# Patient Record
Sex: FEMALE | Race: WHITE | Employment: FULL TIME | URBAN - METROPOLITAN AREA
[De-identification: names, ages, dates, MRNs, and addresses within clinical notes are randomized per-mention and may not be internally consistent; named-entity substitution may affect disease eponyms.]

---

## 2020-11-20 ENCOUNTER — HOSPITAL ENCOUNTER (OUTPATIENT)
Dept: LAB | Age: 25
Discharge: HOME | End: 2020-11-20
Payer: COMMERCIAL

## 2020-11-20 LAB
ALBUMIN SERPL-MCNC: 3.6 G/DL (ref 3.4–5)
ALBUMIN/GLOB SERPL: 0.9 {RATIO} (ref 0.8–1.7)
ALP SERPL-CCNC: 59 U/L (ref 45–117)
ALT SERPL-CCNC: 32 U/L (ref 13–56)
ANION GAP SERPL CALC-SCNC: 4 MMOL/L (ref 3–18)
AST SERPL-CCNC: 15 U/L (ref 10–38)
BILIRUB SERPL-MCNC: 0.3 MG/DL (ref 0.2–1)
BUN SERPL-MCNC: 13 MG/DL (ref 7–18)
BUN/CREAT SERPL: 24 (ref 12–20)
CALCIUM SERPL-MCNC: 9.3 MG/DL (ref 8.5–10.1)
CHLORIDE SERPL-SCNC: 102 MMOL/L (ref 100–111)
CO2 SERPL-SCNC: 32 MMOL/L (ref 21–32)
CREAT SERPL-MCNC: 0.55 MG/DL (ref 0.6–1.3)
ERYTHROCYTE [DISTWIDTH] IN BLOOD BY AUTOMATED COUNT: 12.2 % (ref 11.6–14.5)
GLOBULIN SER CALC-MCNC: 3.8 G/DL (ref 2–4)
GLUCOSE SERPL-MCNC: 84 MG/DL (ref 74–99)
HCG SERPL QL: NEGATIVE
HCT VFR BLD AUTO: 40.9 % (ref 35–45)
HGB BLD-MCNC: 14 G/DL (ref 12–16)
MCH RBC QN AUTO: 32.4 PG (ref 24–34)
MCHC RBC AUTO-ENTMCNC: 34.2 G/DL (ref 31–37)
MCV RBC AUTO: 94.7 FL (ref 74–97)
PLATELET # BLD AUTO: 420 K/UL (ref 135–420)
PMV BLD AUTO: 10.2 FL (ref 9.2–11.8)
POTASSIUM SERPL-SCNC: 5.1 MMOL/L (ref 3.5–5.5)
PROT SERPL-MCNC: 7.4 G/DL (ref 6.4–8.2)
RBC # BLD AUTO: 4.32 M/UL (ref 4.2–5.3)
SODIUM SERPL-SCNC: 138 MMOL/L (ref 136–145)
WBC # BLD AUTO: 8.4 K/UL (ref 4.6–13.2)

## 2020-11-20 PROCEDURE — 80053 COMPREHEN METABOLIC PANEL: CPT

## 2020-11-20 PROCEDURE — 36415 COLL VENOUS BLD VENIPUNCTURE: CPT

## 2020-11-20 PROCEDURE — 84703 CHORIONIC GONADOTROPIN ASSAY: CPT

## 2020-11-20 PROCEDURE — 85027 COMPLETE CBC AUTOMATED: CPT

## 2020-12-07 ENCOUNTER — HOSPITAL ENCOUNTER (OUTPATIENT)
Dept: PREADMISSION TESTING | Age: 25
Discharge: HOME | End: 2020-12-07
Payer: COMMERCIAL

## 2020-12-07 PROCEDURE — 87635 SARS-COV-2 COVID-19 AMP PRB: CPT

## 2020-12-08 LAB — SARS-COV-2, COV2NT: NOT DETECTED

## 2020-12-10 ENCOUNTER — ANESTHESIA EVENT (OUTPATIENT)
Dept: SURGERY | Age: 25
End: 2020-12-10
Payer: COMMERCIAL

## 2020-12-11 ENCOUNTER — HOSPITAL ENCOUNTER (OUTPATIENT)
Age: 25
Setting detail: OUTPATIENT SURGERY
Discharge: HOME | End: 2020-12-11
Attending: ORTHOPAEDIC SURGERY | Admitting: ORTHOPAEDIC SURGERY
Payer: COMMERCIAL

## 2020-12-11 ENCOUNTER — ANESTHESIA (OUTPATIENT)
Dept: SURGERY | Age: 25
End: 2020-12-11
Payer: COMMERCIAL

## 2020-12-11 VITALS
BODY MASS INDEX: 27.89 KG/M2 | RESPIRATION RATE: 16 BRPM | WEIGHT: 173.56 LBS | DIASTOLIC BLOOD PRESSURE: 75 MMHG | HEIGHT: 66 IN | HEART RATE: 93 BPM | OXYGEN SATURATION: 98 % | TEMPERATURE: 98.3 F | SYSTOLIC BLOOD PRESSURE: 123 MMHG

## 2020-12-11 DIAGNOSIS — M25.371 RIGHT ANKLE INSTABILITY: Primary | ICD-10-CM

## 2020-12-11 LAB — HCG UR QL: NEGATIVE

## 2020-12-11 PROCEDURE — 74011250636 HC RX REV CODE- 250/636: Performed by: ANESTHESIOLOGY

## 2020-12-11 PROCEDURE — 76010000153 HC OR TIME 1.5 TO 2 HR: Performed by: ORTHOPAEDIC SURGERY

## 2020-12-11 PROCEDURE — 76210000021 HC REC RM PH II 0.5 TO 1 HR: Performed by: ORTHOPAEDIC SURGERY

## 2020-12-11 PROCEDURE — 76210000006 HC OR PH I REC 0.5 TO 1 HR: Performed by: ORTHOPAEDIC SURGERY

## 2020-12-11 PROCEDURE — 77030020131 HC STRAP ANK FT DISTR S&N -B: Performed by: ORTHOPAEDIC SURGERY

## 2020-12-11 PROCEDURE — 77030006877 HC BLD SHV FLL RAD S&N -B: Performed by: ORTHOPAEDIC SURGERY

## 2020-12-11 PROCEDURE — 77030020782 HC GWN BAIR PAWS FLX 3M -B: Performed by: ORTHOPAEDIC SURGERY

## 2020-12-11 PROCEDURE — 2709999900 HC NON-CHARGEABLE SUPPLY: Performed by: ORTHOPAEDIC SURGERY

## 2020-12-11 PROCEDURE — 74011250636 HC RX REV CODE- 250/636: Performed by: ORTHOPAEDIC SURGERY

## 2020-12-11 PROCEDURE — 77030018789 HC NDL SUT ANCH -A: Performed by: ORTHOPAEDIC SURGERY

## 2020-12-11 PROCEDURE — 74011000250 HC RX REV CODE- 250: Performed by: ANESTHESIOLOGY

## 2020-12-11 PROCEDURE — 77030040361 HC SLV COMPR DVT MDII -B: Performed by: ORTHOPAEDIC SURGERY

## 2020-12-11 PROCEDURE — 77030022877 HC TU IRR ARTHRO PMP ARTH -B: Performed by: ORTHOPAEDIC SURGERY

## 2020-12-11 PROCEDURE — 77030002916 HC SUT ETHLN J&J -A: Performed by: ORTHOPAEDIC SURGERY

## 2020-12-11 PROCEDURE — 76060000034 HC ANESTHESIA 1.5 TO 2 HR: Performed by: ORTHOPAEDIC SURGERY

## 2020-12-11 PROCEDURE — 77030034478 HC TU IRR ARTHRO PT ARTH -B: Performed by: ORTHOPAEDIC SURGERY

## 2020-12-11 PROCEDURE — C1713 ANCHOR/SCREW BN/BN,TIS/BN: HCPCS | Performed by: ORTHOPAEDIC SURGERY

## 2020-12-11 PROCEDURE — 77030002912 HC SUT ETHBND J&J -A: Performed by: ORTHOPAEDIC SURGERY

## 2020-12-11 PROCEDURE — 81025 URINE PREGNANCY TEST: CPT

## 2020-12-11 PROCEDURE — 77030012508 HC MSK AIRWY LMA AMBU -A: Performed by: ANESTHESIOLOGY

## 2020-12-11 PROCEDURE — C9290 INJ, BUPIVACAINE LIPOSOME: HCPCS | Performed by: ORTHOPAEDIC SURGERY

## 2020-12-11 DEVICE — OSTEORAPTOR 2.3 SUTURE ANCHOR WITH                                    ONE ULTRABRAID NO.2 SUTURE COBRAID BLUE
Type: IMPLANTABLE DEVICE | Site: ANKLE | Status: FUNCTIONAL
Brand: OSTEORAPTOR

## 2020-12-11 RX ORDER — PROPOFOL 10 MG/ML
INJECTION, EMULSION INTRAVENOUS AS NEEDED
Status: DISCONTINUED | OUTPATIENT
Start: 2020-12-11 | End: 2020-12-11 | Stop reason: HOSPADM

## 2020-12-11 RX ORDER — FLUMAZENIL 0.1 MG/ML
0.2 INJECTION INTRAVENOUS
Status: DISCONTINUED | OUTPATIENT
Start: 2020-12-11 | End: 2020-12-11 | Stop reason: HOSPADM

## 2020-12-11 RX ORDER — ONDANSETRON 4 MG/1
4 TABLET, ORALLY DISINTEGRATING ORAL
Qty: 20 TAB | Refills: 0 | Status: SHIPPED | OUTPATIENT
Start: 2020-12-11

## 2020-12-11 RX ORDER — SODIUM CHLORIDE 0.9 % (FLUSH) 0.9 %
5-40 SYRINGE (ML) INJECTION AS NEEDED
Status: DISCONTINUED | OUTPATIENT
Start: 2020-12-11 | End: 2020-12-11 | Stop reason: HOSPADM

## 2020-12-11 RX ORDER — ONDANSETRON 2 MG/ML
INJECTION INTRAMUSCULAR; INTRAVENOUS AS NEEDED
Status: DISCONTINUED | OUTPATIENT
Start: 2020-12-11 | End: 2020-12-11 | Stop reason: HOSPADM

## 2020-12-11 RX ORDER — OXYCODONE AND ACETAMINOPHEN 5; 325 MG/1; MG/1
1-2 TABLET ORAL
Qty: 25 TAB | Refills: 0 | Status: SHIPPED | OUTPATIENT
Start: 2020-12-11 | End: 2020-12-14

## 2020-12-11 RX ORDER — KETOROLAC TROMETHAMINE 15 MG/ML
INJECTION, SOLUTION INTRAMUSCULAR; INTRAVENOUS AS NEEDED
Status: DISCONTINUED | OUTPATIENT
Start: 2020-12-11 | End: 2020-12-11 | Stop reason: HOSPADM

## 2020-12-11 RX ORDER — MAGNESIUM SULFATE 100 %
4 CRYSTALS MISCELLANEOUS AS NEEDED
Status: DISCONTINUED | OUTPATIENT
Start: 2020-12-11 | End: 2020-12-11 | Stop reason: HOSPADM

## 2020-12-11 RX ORDER — HYDROMORPHONE HYDROCHLORIDE 1 MG/ML
0.5 INJECTION, SOLUTION INTRAMUSCULAR; INTRAVENOUS; SUBCUTANEOUS
Status: DISCONTINUED | OUTPATIENT
Start: 2020-12-11 | End: 2020-12-11 | Stop reason: HOSPADM

## 2020-12-11 RX ORDER — SODIUM CHLORIDE 0.9 % (FLUSH) 0.9 %
5-40 SYRINGE (ML) INJECTION EVERY 8 HOURS
Status: DISCONTINUED | OUTPATIENT
Start: 2020-12-11 | End: 2020-12-11 | Stop reason: HOSPADM

## 2020-12-11 RX ORDER — DEXAMETHASONE SODIUM PHOSPHATE 4 MG/ML
INJECTION, SOLUTION INTRA-ARTICULAR; INTRALESIONAL; INTRAMUSCULAR; INTRAVENOUS; SOFT TISSUE AS NEEDED
Status: DISCONTINUED | OUTPATIENT
Start: 2020-12-11 | End: 2020-12-11 | Stop reason: HOSPADM

## 2020-12-11 RX ORDER — CEFAZOLIN SODIUM/WATER 2 G/20 ML
2 SYRINGE (ML) INTRAVENOUS ONCE
Status: COMPLETED | OUTPATIENT
Start: 2020-12-11 | End: 2020-12-11

## 2020-12-11 RX ORDER — SODIUM CHLORIDE, SODIUM LACTATE, POTASSIUM CHLORIDE, CALCIUM CHLORIDE 600; 310; 30; 20 MG/100ML; MG/100ML; MG/100ML; MG/100ML
125 INJECTION, SOLUTION INTRAVENOUS CONTINUOUS
Status: DISCONTINUED | OUTPATIENT
Start: 2020-12-11 | End: 2020-12-11 | Stop reason: HOSPADM

## 2020-12-11 RX ORDER — FENTANYL CITRATE 50 UG/ML
25 INJECTION, SOLUTION INTRAMUSCULAR; INTRAVENOUS AS NEEDED
Status: DISCONTINUED | OUTPATIENT
Start: 2020-12-11 | End: 2020-12-11 | Stop reason: HOSPADM

## 2020-12-11 RX ORDER — ROPIVACAINE HYDROCHLORIDE 5 MG/ML
INJECTION, SOLUTION EPIDURAL; INFILTRATION; PERINEURAL AS NEEDED
Status: DISCONTINUED | OUTPATIENT
Start: 2020-12-11 | End: 2020-12-11 | Stop reason: HOSPADM

## 2020-12-11 RX ORDER — NALOXONE HYDROCHLORIDE 4 MG/.1ML
SPRAY NASAL
Qty: 1 EACH | Refills: 0 | Status: SHIPPED | OUTPATIENT
Start: 2020-12-11

## 2020-12-11 RX ORDER — HYDROMORPHONE HYDROCHLORIDE 2 MG/ML
INJECTION, SOLUTION INTRAMUSCULAR; INTRAVENOUS; SUBCUTANEOUS AS NEEDED
Status: DISCONTINUED | OUTPATIENT
Start: 2020-12-11 | End: 2020-12-11 | Stop reason: HOSPADM

## 2020-12-11 RX ORDER — MIDAZOLAM HYDROCHLORIDE 1 MG/ML
INJECTION, SOLUTION INTRAMUSCULAR; INTRAVENOUS AS NEEDED
Status: DISCONTINUED | OUTPATIENT
Start: 2020-12-11 | End: 2020-12-11 | Stop reason: HOSPADM

## 2020-12-11 RX ORDER — GLYCOPYRROLATE 0.2 MG/ML
INJECTION INTRAMUSCULAR; INTRAVENOUS AS NEEDED
Status: DISCONTINUED | OUTPATIENT
Start: 2020-12-11 | End: 2020-12-11 | Stop reason: HOSPADM

## 2020-12-11 RX ORDER — LIDOCAINE HYDROCHLORIDE 20 MG/ML
INJECTION, SOLUTION EPIDURAL; INFILTRATION; INTRACAUDAL; PERINEURAL AS NEEDED
Status: DISCONTINUED | OUTPATIENT
Start: 2020-12-11 | End: 2020-12-11 | Stop reason: HOSPADM

## 2020-12-11 RX ORDER — SODIUM CHLORIDE, SODIUM LACTATE, POTASSIUM CHLORIDE, CALCIUM CHLORIDE 600; 310; 30; 20 MG/100ML; MG/100ML; MG/100ML; MG/100ML
1000 INJECTION, SOLUTION INTRAVENOUS CONTINUOUS
Status: DISCONTINUED | OUTPATIENT
Start: 2020-12-11 | End: 2020-12-11 | Stop reason: HOSPADM

## 2020-12-11 RX ORDER — NALOXONE HYDROCHLORIDE 0.4 MG/ML
0.1 INJECTION, SOLUTION INTRAMUSCULAR; INTRAVENOUS; SUBCUTANEOUS AS NEEDED
Status: DISCONTINUED | OUTPATIENT
Start: 2020-12-11 | End: 2020-12-11 | Stop reason: HOSPADM

## 2020-12-11 RX ORDER — ACETAMINOPHEN 500 MG
500 TABLET ORAL
COMMUNITY

## 2020-12-11 RX ADMIN — Medication 2 G: at 11:23

## 2020-12-11 RX ADMIN — LIDOCAINE HYDROCHLORIDE 80 MG: 20 INJECTION, SOLUTION EPIDURAL; INFILTRATION; INTRACAUDAL; PERINEURAL at 11:15

## 2020-12-11 RX ADMIN — MIDAZOLAM 2 MG: 1 INJECTION INTRAMUSCULAR; INTRAVENOUS at 11:06

## 2020-12-11 RX ADMIN — SODIUM CHLORIDE, SODIUM LACTATE, POTASSIUM CHLORIDE, AND CALCIUM CHLORIDE: 600; 310; 30; 20 INJECTION, SOLUTION INTRAVENOUS at 11:37

## 2020-12-11 RX ADMIN — DEXAMETHASONE SODIUM PHOSPHATE 4 MG: 4 INJECTION, SOLUTION INTRAMUSCULAR; INTRAVENOUS at 11:32

## 2020-12-11 RX ADMIN — PROPOFOL 75 MG: 10 INJECTION, EMULSION INTRAVENOUS at 11:16

## 2020-12-11 RX ADMIN — GLYCOPYRROLATE 0.1 MG: 0.2 INJECTION INTRAMUSCULAR; INTRAVENOUS at 11:32

## 2020-12-11 RX ADMIN — SODIUM CHLORIDE, SODIUM LACTATE, POTASSIUM CHLORIDE, AND CALCIUM CHLORIDE 125 ML/HR: 600; 310; 30; 20 INJECTION, SOLUTION INTRAVENOUS at 08:46

## 2020-12-11 RX ADMIN — HYDROMORPHONE HYDROCHLORIDE 0.2 MG: 2 INJECTION, SOLUTION INTRAMUSCULAR; INTRAVENOUS; SUBCUTANEOUS at 11:45

## 2020-12-11 RX ADMIN — HYDROMORPHONE HYDROCHLORIDE 0.2 MG: 2 INJECTION, SOLUTION INTRAMUSCULAR; INTRAVENOUS; SUBCUTANEOUS at 11:56

## 2020-12-11 RX ADMIN — HYDROMORPHONE HYDROCHLORIDE 0.2 MG: 2 INJECTION, SOLUTION INTRAMUSCULAR; INTRAVENOUS; SUBCUTANEOUS at 12:27

## 2020-12-11 RX ADMIN — GLYCOPYRROLATE 0.1 MG: 0.2 INJECTION INTRAMUSCULAR; INTRAVENOUS at 11:34

## 2020-12-11 RX ADMIN — SODIUM CHLORIDE, SODIUM LACTATE, POTASSIUM CHLORIDE, AND CALCIUM CHLORIDE 125 ML/HR: 600; 310; 30; 20 INJECTION, SOLUTION INTRAVENOUS at 13:15

## 2020-12-11 RX ADMIN — HYDROMORPHONE HYDROCHLORIDE 0.2 MG: 2 INJECTION, SOLUTION INTRAMUSCULAR; INTRAVENOUS; SUBCUTANEOUS at 11:17

## 2020-12-11 RX ADMIN — ONDANSETRON HYDROCHLORIDE 4 MG: 2 INJECTION INTRAMUSCULAR; INTRAVENOUS at 12:33

## 2020-12-11 RX ADMIN — KETOROLAC TROMETHAMINE 30 MG: 15 INJECTION, SOLUTION INTRAMUSCULAR; INTRAVENOUS at 12:38

## 2020-12-11 RX ADMIN — PROPOFOL 200 MG: 10 INJECTION, EMULSION INTRAVENOUS at 11:15

## 2020-12-11 NOTE — PERIOP NOTES
Reviewed PTA medication list with patient/caregiver and patient/caregiver denies any additional medications. Patient admits to having a responsible adult care for them at home for at least 24 hours after surgery. Patient encouraged to use gown warming system and informed that using said warming gown to regulate body temperature prior to a procedure has been shown to help reduce the risks of blood clots and infection. Patient's pharmacy of choice verified and documented in PTA medication section. Dual skin assessment & fall risk band verification completed with AMBROCIO Donis RN.

## 2020-12-11 NOTE — PERIOP NOTES
Security at bedside returned belongings. DC IV to right hand, no further bleeding noted, bandage applied. Patient armband removed and shredded. DC in wheelchair with crutches and with mother to car.

## 2020-12-11 NOTE — PERIOP NOTES
TRANSFER - OUT REPORT:    Verbal report given to Malathi De La Fuente RN(name) on Garlan Sensing  being transferred to phase 2(unit) for routine post - op       Report consisted of patients Situation, Background, Assessment and   Recommendations(SBAR). Information from the following report(s) SBAR, Kardex, OR Summary, Procedure Summary, Intake/Output and MAR was reviewed with the receiving nurse. Lines:   Peripheral IV 12/11/20 Left;Posterior Hand (Active)   Site Assessment Clean, dry, & intact 12/11/20 1313   Phlebitis Assessment 0 12/11/20 1313   Infiltration Assessment 0 12/11/20 1313   Dressing Status Clean, dry, & intact 12/11/20 1313   Dressing Type Transparent;Tape 12/11/20 1313   Hub Color/Line Status Pink; Infusing 12/11/20 1313   Alcohol Cap Used No 12/11/20 0846        Opportunity for questions and clarification was provided.       Patient transported with:   [facility]

## 2020-12-11 NOTE — H&P
Patient Name:  Aparna Jiang  Account #:  [de-identified]  YOB: 1995      Chief Complaint:  Right ankle pain. History of Chief Complaint:  Ms. Malachi Ayon comes in today. She was last seen in September. She is a 44-year-old who has had multiple ankle sprains. She was hiking and had a twisting injury. She had immediate pain and swelling and had difficulty weightbearing. She states that she has chronically felt her ankle is giving out easier and easier. She is complaining of pain over the peroneal tendons as well as failure to improve with these conservative measures. Past Medical/Surgical History:    Disease/Disorder Date Side Surgery Date Side Comment   Anxiety         Depression            Arthroscopy hip 2012 bilateral      Allergies:  No known allergies. Ingredient Reaction Medication Name Comment   NO KNOWN ALLERGIES        Current Medications:    Medication Directions   bupropion HCl  mg 24 hr tablet, extended release take 1 tablet by oral route  every day   ibuprofen 600 mg tablet  as needed   lorazepam 0.5 mg tablet    lorazepam 1 mg tablet    Mono-Linyah 0.25 mg-35 mcg tablet take 1 tablet by oral route  every day   Paxil 30 mg tablet take 1 tablet by oral route  every day     Social History:    SMOKING  Status Tobacco Type Units Per Day Yrs Used   Never smoker        ALCOHOL  There is no history of alcohol use. Family History:    Disease Detail Family Member Age Cause of Death Comments   Cancer Father  N    Diabetes Father  N    Hypertension Father  N      Review of Systems:    GENERAL:  Patient has no signs of fever or chills. or weight change  HEAD/ENTM:  Patient has no signs of headaches, dizziness, hearing loss, ringing in ears, sore throat/hoarseness, recent cold, double vision, blurred vision, itchy eyes, eye redness or eye discharge. CARDIOVASCULAR:  Patient has no signs of chest pain, palpitations, rheumatic fever or heart murmur.   RESPIRATORY:  Patient has no signs of chronic cough, wheezing, difficulty breathing, pain on breathing or shortness of breath. GASTROINTESTINAL:  Patient has no signs of nausea/vomiting, difficulty swallowing, gas/bloating, indigestion, abdominal pain, diarrhea, bloody stools or hemorrhoids. GENITOURINARY:  Patient has no signs of blood in urine, painful urinating, burning sensation, bladder/kidney infection, frequent urinating or incontinence. MUSCULOSKELETAL: Patient presents with joint stiffness and joint pain. Patient has no signs of fracture/dislocation, sprain/strain, tendonitis, rheumatoid disease, gout or swelling of feet. INTEGUMENTARY:  Patient has no signs of rash/itching, psoriasis, Raynaud's phenomenon or varicose veins. EMOTIONAL:  Patient has no signs of anxiety, depression, bipolar disorder, memory loss or change in mood. Vitals:  Date BP Pulse Temp (F) Resp. (per min.) Height (Total in.) Weight (lbs.) BMI   10/21/2020     67.00 167.00 26.16   09/24/2020     67.00  26.16     Physical Examination:  Examination of her left ankle shows a mildly high arch with a 2+ talar tilt. Right side shows swelling and tenderness over the peroneal tendon, mild cavovarus foot deformity. She has slight decreased range of motion of the subtalar joint with guarding. She does have 1+ talar tilt. She has pain with anterior drawer testing. Radiograph Examination: I reviewed her MRI with her. The MRI shows a peroneus brevis tear, strain of the ATFL and CFL, large bone bruising along the medial talar neck, without evidence of osteochondral lesion. Impression:  Right ankle stability, peroneal tendonitis and tear. Plan:  We discussed with her the fact that the bone bruise can take a long time to get better. This may be why she has failed to improve so far, but she does have a peroneus brevis tear as well as chronic ankle instability.  With this degree of instability and pain without evidence of tarsal coalition, I think it is reasonable to consider moving ahead with surgery. She would like to move ahead with that. This would be an outpatient right ankle arthroscopic evaluation to look to make sure there is no scarring of the deltoid or other damage to the cartilage, and then ligament reconstruction and Brostrom with a peroneus brevis repair. This would be open due to the peroneal tendon tear. We discussed the postoperative course would be about four weeks non-weightbearing, early range of motion, splint for the first week and then place her in the boot. We would plan on weightbearing at about four weeks, and then start weaning to an ankle brace. She states her brother is a , and discussed possible ligament augmentation device. At this point, with this being her primary surgery and a peroneus brevis tear, I do not think the ligament augmentation device would actually change her postop course enough to add the extra material, but it is certainly an option if she fails. We discussed the findings and discussed surgical indications, and went over this with her. She will set up for an outpatient right ankle scope, Brostrom, and brevis repair. Edi Hernandez MD/ Jayla Aguero    CC Providers:  (No PCP listed)

## 2020-12-11 NOTE — DISCHARGE INSTRUCTIONS
DISCHARGE SUMMARY from Nurse    Increase fluids today. No driving today or until cleared by Dr. Sara Crum. Keep ice to surgical area as directed and keep elevated. Do not get dressing wet. Do Not remove dressing until follow up appointment. Resume' previous diet but NO ADDED SALT. PATIENT INSTRUCTIONS:    After general anesthesia or intravenous sedation, for 24 hours or while taking prescription Narcotics:  · Limit your activities  · Do not drive and operate hazardous machinery  · Do not make important personal or business decisions  · Do  not drink alcoholic beverages  · If you have not urinated within 8 hours after discharge, please contact your surgeon on call. Report the following to your surgeon:  · Excessive pain, swelling, redness or odor of or around the surgical area  · Temperature over 100.5  · Nausea and vomiting lasting longer than 4 hours or if unable to take medications  · Any signs of decreased circulation or nerve impairment to extremity: change in color, persistent  numbness, tingling, coldness or increase pain  · Any questions    What to do at Home:  Recommended activity: Activity as noted. If you experience any of the following symptoms as noted above, please follow up with Dr. Meghan Gaitan.    *  Please give a list of your current medications to your Primary Care Provider. *  Please update this list whenever your medications are discontinued, doses are      changed, or new medications (including over-the-counter products) are added. *  Please carry medication information at all times in case of emergency situations. These are general instructions for a healthy lifestyle:    No smoking/ No tobacco products/ Avoid exposure to second hand smoke  Surgeon General's Warning:  Quitting smoking now greatly reduces serious risk to your health.     Obesity, smoking, and sedentary lifestyle greatly increases your risk for illness    A healthy diet, regular physical exercise & weight monitoring are important for maintaining a healthy lifestyle    You may be retaining fluid if you have a history of heart failure or if you experience any of the following symptoms:  Weight gain of 3 pounds or more overnight or 5 pounds in a week, increased swelling in our hands or feet or shortness of breath while lying flat in bed. Please call your doctor as soon as you notice any of these symptoms; do not wait until your next office visit. The discharge information has been reviewed with the patient and caregiver. The patient and caregiver verbalized understanding. Discharge medications reviewed with the patient and caregiver and appropriate educational materials and side effects teaching were provided. Patient armband removed and shredded. 10 Things to Do When You Have COVID-19    Stay home. Don't go to school, work, or public areas. And don't use public transportation, ride-shares, or taxis unless you have no choice. Leave your home only if you need to get medical care. But call the doctor's office first so they know you're coming. And wear a cloth face cover. Ask before leaving isolation. Talk with your doctor or other health professional about when it will be safe for you to leave isolation. Wear a cloth face cover when you are around other people. It can help stop the spread of the virus when you cough or sneeze. Limit contact with people in your home. If possible, stay in a separate bedroom and use a separate bathroom. Avoid contact with pets and other animals. If possible, have a friend or family member care for them while you're sick. Cover your mouth and nose with a tissue when you cough or sneeze. Then throw the tissue in the trash right away. Wash your hands often, especially after you cough or sneeze. Use soap and water, and scrub for at least 20 seconds. If soap and water aren't available, use an alcohol-based hand .      Don't share personal household items. These include bedding, towels, cups and glasses, and eating utensils. Clean and disinfect your home every day. Use household  or disinfectant wipes or sprays. Take special care to clean things that you grab with your hands. These include doorknobs, remote controls, phones, and handles on your refrigerator and microwave. And don't forget countertops, tabletops, bathrooms, and computer keyboards. Take acetaminophen (Tylenol) to relieve fever and body aches. Read and follow all instructions on the label. Current as of: July 10, 2020               Content Version: 12.6  © 5395-7641 [employer]. Care instructions adapted under license by [employer] (which disclaims liability or warranty for this information). If you have questions about a medical condition or this instruction, always ask your healthcare professional. [address] any warranty or liability for your use of this information.     ___________________________________________________________________________________________________________________________________

## 2020-12-11 NOTE — ANESTHESIA PREPROCEDURE EVALUATION
Relevant Problems   No relevant active problems       Anesthetic History   No history of anesthetic complications            Review of Systems / Medical History  Patient summary reviewed, nursing notes reviewed and pertinent labs reviewed    Pulmonary  Within defined limits            Pertinent negatives: No asthma and smoker     Neuro/Psych         Psychiatric history     Cardiovascular                  Exercise tolerance: >4 METS     GI/Hepatic/Renal  Within defined limits           Pertinent negatives: No GERD, liver disease and renal disease   Endo/Other          Pertinent negatives: No diabetes, hypothyroidism and hyperthyroidism   Other Findings            Physical Exam    Airway  Mallampati: II  TM Distance: 4 - 6 cm  Neck ROM: normal range of motion   Mouth opening: Normal     Cardiovascular               Dental      Comments: Split corners of mouth   Pulmonary  Breath sounds clear to auscultation               Abdominal  GI exam deferred       Other Findings            Anesthetic Plan    ASA: 2  Anesthesia type: general          Induction: Intravenous  Anesthetic plan and risks discussed with: Patient

## 2020-12-11 NOTE — BRIEF OP NOTE
Brief Postoperative Note    Patient: Filemon Rodriguez  YOB: 1995  MRN: [id number]    Date of Procedure: 12/11/2020     Pre-Op Diagnosis: RIGHT ANKLE INSTABILITY,PERONEUS BREVIS TEAR,ANKLE SPRAIN    Post-Op Diagnosis: Same as preoperative diagnosis. Procedure(s):  RIGHT ANKLE ARTHROSCOPY,BROSTROM,PERONEUS BREVIS REPAIR    Surgeon(s):  Ino De Oliveira MD    Surgical Assistant: Surg Asst-1: Korey Gaona    Anesthesia: General     Estimated Blood Loss (mL): Minimal    Complications: None    Specimens: * No specimens in log *     Implants:   Implant Name Type Inv.  Item Serial No.  Lot No. LRB No. Used Action   ANCHOR SUT OSTEONAPTOR WEN 2.3 --  - [id number]  ANCHOR SUT OSTEONAPTOR WEN 2.3 --   [employer] [id number] Right 1 Implanted   ANCHOR SUT OSTEONAPTOR WEN 2.3 --  - [id number]  ANCHOR SUT OSTEONAPTOR WEN 2.3 --   [employer] [id number] Right 1 Implanted   ANCHOR SUT OSTEONAPTOR WEN 2.3 --  - [id number]  ANCHOR SUT OSTEONAPTOR WEN 2.3 --   [employer]WD [id number] Right 1 Implanted       Drains: * No LDAs found *    Findings: medial gutter scarring  Small chondral injury tibia    Electronically Signed by Elliott Cisneros MD on 12/11/2020 at 1:06 PM

## 2020-12-11 NOTE — ANESTHESIA POSTPROCEDURE EVALUATION
Procedure(s):  RIGHT ANKLE ARTHROSCOPY,BROSTROM,PERONEUS BREVIS REPAIR. general    Anesthesia Post Evaluation        Comments: Post-Anesthesia Evaluation and Assessment    Cardiovascular Function/Vital Signs  /79 (BP 1 Location: Right arm, BP Patient Position: At rest;Sitting)   Pulse 95   Temp 36.8 °C (98.3 °F)   Resp 15   Ht 5' 6\" (1.676 m)   Wt 78.7 kg (173 lb 9 oz)   SpO2 97%   BMI 28.01 kg/m²     Patient is status post Procedure(s):  RIGHT ANKLE ARTHROSCOPY,BROSTROM,PERONEUS BREVIS REPAIR. Nausea/Vomiting: Controlled. Postoperative hydration reviewed and adequate. Pain:  Pain Scale 1: Numeric (0 - 10) (12/11/20 1311)  Pain Intensity 1: 0 (12/11/20 1311)   Managed. Neurological Status:   Neuro (WDL): Within Defined Limits (12/11/20 0851)   At baseline. Mental Status and Level of Consciousness: Arousable. Pulmonary Status:   O2 Device: Room air (12/11/20 1311)   Adequate oxygenation and airway patent. Complications related to anesthesia: None    Post-anesthesia assessment completed. No concerns. Patient has met all discharge requirements. Signed By: Fede Stone MD    December 11, 2020                   INITIAL Post-op Vital signs:   Vitals Value Taken Time   /70 12/11/2020  1:30 PM   Temp 36.8 °C (98.3 °F) 12/11/2020 12:59 PM   Pulse 94 12/11/2020  1:34 PM   Resp 16 12/11/2020  1:34 PM   SpO2 97 % 12/11/2020  1:34 PM   Vitals shown include unvalidated device data.

## 2020-12-11 NOTE — INTERVAL H&P NOTE
Update History & Physical 
 
The Patient's History and Physical was reviewed with the patient and I examined the patient. There was no change. The surgical site was confirmed by the patient and me. Plan:  The risk, benefits, expected outcome, and alternative to the recommended procedure have been discussed with the patient. Patient understands and wants to proceed with the procedure.  
 
Electronically signed by Charlie Gill MD on 12/11/2020 at 10:48 AM

## 2020-12-12 NOTE — OP NOTES
[facility] [place]  OPERATIVE REPORT    Name:  Kathrine Watt  MR#:   [id number]  :  1995  ACCOUNT #:  [de-identified]  DATE OF SERVICE:  2020    PREOPERATIVE DIAGNOSES:  Right ankle instability, right peroneus brevis tear, ankle sprain. POSTOPERATIVE DIAGNOSES:  Synovitis of the medial ankle gutter with posteromedial impingement, chondral injury of the tibia, 3 to 3.5 cm long peroneus brevis tear, and right ankle instability. PROCEDURE PERFORMED:  Right ankle scope, debridement, brostrom ligament reconstruction, peroneus brevis repair    SURGEON:  Isabella Oneal MD    ASSISTANT:  Nita Patel and Milagro Marie. ANESTHESIA:  General.    COMPLICATIONS:  None. SPECIMENS REMOVED:  None. IMPLANTS:  2.3 Osteoraptors. DRAINS:  None. ESTIMATED BLOOD LOSS:  Less than 50. INDICATIONS:  The patient has been complaining of right-sided lateral side of ankle pain, made worse with position as well as activities. She had multiple ankle sprains. She was hiking and had immediate pain, difficulty weightbearing. She came in and x-ray showed no fracture. Having tried conservative measures and having multiple sprains, she had an MRI which showed a peroneus brevis tear and a large bone bruise on the medial side of the talus. PROCEDURE:  She was marked preoperatively, and after marking, she was taken to the operating room. She underwent general anesthesia, supine on the operating table, padded appropriately. The time-out showed the correct limb and correct patient. After reviewing the x-rays, time-out, and indications, her leg was placed in the [facility] CT leg marinelli on a bone foam, prepped and draped. After reviewing this, we then injected the portals with Naropin plain and the two-portal arthroscopy showed a small amount of anterior synovitis with extensive scarring on the medial gutter. There was a chondral injury on the medial malleolus on the lateral side.   We debrided the synovitis. Debrided the scar tissue. Lateral side showed there was definitely some scar tissue at the distal aspect of the fibula, as well as anterior scar tissue. We distracted the joint. We did not see any full-thickness chondral injuries. No signs of loose bodies and overall good alignment of the cartilage. We then removed the camera, closed the portals with nylon, took the leg out of the Ferkel leg marinelli, internally rotated this. Stressing this showed she did have some laxity. We opened up with the #15 blade along parallel to the fibula, carrying it up to the sinus tarsi, sharply down through skin. Transversing veins were cauterized. We then opened up the peroneal retinaculum, found there to be a fair amount of fluid. The peroneus brevis was quite flat and there was a split tear that went from anterior all the way through posterior, with the Hartland elevator. The muscle belly appeared to be in good alignment. The peroneus longus was in good repair. We threaded this tear and then repaired it with 3-0 Ethibond suture in a running baseball stitch. This appeared to give us a stable construct. We washed this out, retracted the tendon, stripped the ATFL and CFL, which were hypertrophic and scarred, off the distal fibula. We placed 2.3 Osteoraptors at the origin of the ATFL and CFL. We then repaired back down the ligaments, reefing it to the periosteum and going back to the extensor retinaculum. We washed this out with normal saline with Betadine. We closed the peroneal retinaculum with Vicryl. Closed the skin with Vicryl, then closed with Monocryl. Injected Exparel, morphine, and Naropin along the incision. Injected the joint with Naropin, ropivacaine, and Exparel. Xeroform was placed over the nylon closed scope portals. She was placed in a sterile soft dressing, three-sided splint. She was extubated, transferred to recovery room. She is alert and cooperative. No signs of complications. Postoperative medications were sent to her pharmacy. Her family was counseled to her findings.     IMPRESSION:  Right ankle Netta, brevis repair, chondral injury to the tibia,      Quinton Rivera MD      [id number]  D:  12/11/2020 13:21  T:  12/11/2020 23:50  JOB #:  [id number]

## 2022-01-07 ENCOUNTER — TRANSCRIBE ORDER (OUTPATIENT)
Dept: REGISTRATION | Age: 27
End: 2022-01-07

## 2022-01-07 ENCOUNTER — HOSPITAL ENCOUNTER (OUTPATIENT)
Dept: PREADMISSION TESTING | Age: 27
Discharge: HOME | End: 2022-01-07
Payer: COMMERCIAL

## 2022-01-07 DIAGNOSIS — M65.871 OTHER SYNOVITIS AND TENOSYNOVITIS, RIGHT ANKLE AND FOOT: Primary | ICD-10-CM

## 2022-01-07 LAB
ALBUMIN SERPL-MCNC: 3.4 G/DL (ref 3.4–5)
ALBUMIN/GLOB SERPL: 0.9 {RATIO} (ref 0.8–1.7)
ALP SERPL-CCNC: 56 U/L (ref 45–117)
ALT SERPL-CCNC: 23 U/L (ref 13–56)
ANION GAP SERPL CALC-SCNC: 3 MMOL/L (ref 3–18)
AST SERPL-CCNC: 14 U/L (ref 10–38)
BILIRUB SERPL-MCNC: 0.4 MG/DL (ref 0.2–1)
BUN SERPL-MCNC: 14 MG/DL (ref 7–18)
BUN/CREAT SERPL: 20 (ref 12–20)
CALCIUM SERPL-MCNC: 9.3 MG/DL (ref 8.5–10.1)
CHLORIDE SERPL-SCNC: 107 MMOL/L (ref 100–111)
CO2 SERPL-SCNC: 29 MMOL/L (ref 21–32)
CREAT SERPL-MCNC: 0.69 MG/DL (ref 0.6–1.3)
ERYTHROCYTE [DISTWIDTH] IN BLOOD BY AUTOMATED COUNT: 11.9 % (ref 11.6–14.5)
GLOBULIN SER CALC-MCNC: 3.7 G/DL (ref 2–4)
GLUCOSE SERPL-MCNC: 95 MG/DL (ref 74–99)
HCG SERPL QL: NEGATIVE
HCT VFR BLD AUTO: 43.1 % (ref 35–45)
HGB BLD-MCNC: 14 G/DL (ref 12–16)
MCH RBC QN AUTO: 31.7 PG (ref 24–34)
MCHC RBC AUTO-ENTMCNC: 32.5 G/DL (ref 31–37)
MCV RBC AUTO: 97.7 FL (ref 78–100)
NRBC # BLD: 0 K/UL (ref 0–0.01)
NRBC BLD-RTO: 0 PER 100 WBC
PLATELET # BLD AUTO: 364 K/UL (ref 135–420)
PMV BLD AUTO: 9.7 FL (ref 9.2–11.8)
POTASSIUM SERPL-SCNC: 4.8 MMOL/L (ref 3.5–5.5)
PROT SERPL-MCNC: 7.1 G/DL (ref 6.4–8.2)
RBC # BLD AUTO: 4.41 M/UL (ref 4.2–5.3)
SODIUM SERPL-SCNC: 139 MMOL/L (ref 136–145)
WBC # BLD AUTO: 14 K/UL (ref 4.6–13.2)

## 2022-01-07 PROCEDURE — 84703 CHORIONIC GONADOTROPIN ASSAY: CPT

## 2022-01-07 PROCEDURE — 36415 COLL VENOUS BLD VENIPUNCTURE: CPT

## 2022-01-07 PROCEDURE — 80053 COMPREHEN METABOLIC PANEL: CPT

## 2022-01-07 PROCEDURE — 85027 COMPLETE CBC AUTOMATED: CPT

## 2022-01-24 ENCOUNTER — HOSPITAL ENCOUNTER (OUTPATIENT)
Dept: PREADMISSION TESTING | Age: 27
Discharge: HOME | End: 2022-01-24
Payer: COMMERCIAL

## 2022-01-24 ENCOUNTER — HOSPITAL ENCOUNTER (OUTPATIENT)
Dept: PREADMISSION TESTING | Age: 27
Discharge: HOME | End: 2022-01-24

## 2022-01-24 VITALS — WEIGHT: 170 LBS | BODY MASS INDEX: 27.32 KG/M2 | HEIGHT: 66 IN

## 2022-01-24 LAB — SARS-COV-2, NAA: NOT DETECTED

## 2022-01-24 PROCEDURE — U0003 INFECTIOUS AGENT DETECTION BY NUCLEIC ACID (DNA OR RNA); SEVERE ACUTE RESPIRATORY SYNDROME CORONAVIRUS 2 (SARS-COV-2) (CORONAVIRUS DISEASE [COVID-19]), AMPLIFIED PROBE TECHNIQUE, MAKING USE OF HIGH THROUGHPUT TECHNOLOGIES AS DESCRIBED BY CMS-2020-01-R: HCPCS

## 2022-01-24 RX ORDER — SODIUM CHLORIDE, SODIUM LACTATE, POTASSIUM CHLORIDE, CALCIUM CHLORIDE 600; 310; 30; 20 MG/100ML; MG/100ML; MG/100ML; MG/100ML
125 INJECTION, SOLUTION INTRAVENOUS CONTINUOUS
Status: CANCELLED | OUTPATIENT
Start: 2022-01-24

## 2022-01-24 RX ORDER — BISMUTH SUBSALICYLATE 262 MG
1 TABLET,CHEWABLE ORAL DAILY
COMMUNITY

## 2022-01-24 RX ORDER — CEFAZOLIN SODIUM/WATER 2 G/20 ML
2 SYRINGE (ML) INTRAVENOUS ONCE
Status: CANCELLED | OUTPATIENT
Start: 2022-01-24 | End: 2022-01-24

## 2022-01-24 NOTE — PERIOP NOTES
Covid guidelines reviewed. Self-Quarantine reviewed and patient instructed to report any symptoms of Covid to Surgeon, or report to surgeon if unable to comply with guidelines. Patient instructed to leave all valuables at home including cell phone.

## 2022-01-27 ENCOUNTER — ANESTHESIA EVENT (OUTPATIENT)
Dept: SURGERY | Age: 27
End: 2022-01-27
Payer: COMMERCIAL

## 2022-01-27 NOTE — H&P
Patient Name:  Lurene Lanes  Account #:  [de-identified]  YOB: 1995      Chief Complaint:  Right ankle pain. History of Chief Complaint:  Di Bar comes in today following up. She has had a previous Brostrom, peroneus brevis repair and ankle scope. When we saw her previously she had persistent symptoms. She had an MRI with arthrogram which shows scarring in the gutter, as well as no full-thickness chondral injury. The radiologist felt that her peroneus brevis was intact and her ligament reconstruction appeared to be intact and overall felt like the ligaments were in good repair. She is complaining of increasing pain as well as some pain in the outside of her foot. Past Medical/Surgical History:    Disease/Disorder  Date  Side  Surgery  Date  Side  Comment  Anxiety              Depression                    Ankle scope, Brostrom, ligament reconstruction, peroneus brevis repair  12/11/2020  right          Arthroscopy hip  2012  bilateral      Allergies:  No known allergies. Ingredient  Reaction  Medication Name  Comment  NO KNOWN ALLERGIES            Current Medications:    Medication  Directions  bupropion HCl  mg 24 hr tablet, extended release  take 1 tablet by oral route  every day  ibuprofen 600 mg tablet   as needed  lorazepam 0.5 mg tablet    lorazepam 1 mg tablet    Mono-Linyah 0.25 mg-35 mcg tablet  take 1 tablet by oral route  every day  Paxil 30 mg tablet  take 1 tablet by oral route  every day    Social History:    SMOKING  Status  Tobacco Type  Units Per Day  Yrs Used  Never smoker          ALCOHOL  There is no history of alcohol use. Family History:    Disease Detail  Family Member  Age  Cause of Death  Comments  Cancer  Father    N    Diabetes  Father    N    Hypertension  Father    N      Review of Systems:    GENERAL:  Patient has no signs of fever, chills or weight change.   HEAD/ENTM:  Patient has no signs of headaches, dizziness, hearing loss, ringing in ears, sore throat/hoarseness, recent cold, double vision, blurred vision, itchy eyes, eye redness or eye discharge. NEUROLOGIC:  Patient has no signs of fainting, muscle weakness, numbness/tingling, loss of balance or seizure disorder. CARDIOVASCULAR:  Patient has no signs of chest pain, palpitations, rheumatic fever or heart murmur. RESPIRATORY:  Patient has no signs of chronic cough, wheezing, difficulty breathing, pain on breathing or shortness of breath. GASTROINTESTINAL:  Patient has no signs of nausea/vomiting, difficulty swallowing, gas/bloating, indigestion, abdominal pain, diarrhea, bloody stools or hemorrhoids. GENITOURINARY:  Patient has no signs of blood in urine, painful urinating, burning sensation, bladder/kidney infection, frequent urinating or incontinence. MUSCULOSKELETAL: Patient presents with joint pain. Patient has no signs of fracture/dislocation, sprain/strain, tendonitis, joint stiffness, rheumatoid disease, gout or swelling of feet. INTEGUMENTARY:  Patient has no signs of rash/itching, psoriasis, Raynaud's phenomenon or varicose veins. EMOTIONAL:  Patient has no signs of anxiety, depression, bipolar disorder, memory loss or change in mood. Vitals:  Date  BP  Pulse  Temp (F)  Resp. (per min.)  Height (Total in.)  Weight (lbs.)  BMI  01/03/2022          67.00  167.00  26.16  09/13/2021          67.00    26.16  06/23/2021          67.00    26.16  04/19/2021          67.00    26.16  03/03/2021          67.00    26.16  12/21/2020          67.00    26.16  10/21/2020          67.00    26.16  09/24/2020          67.00    26.16    Physical Examination:  Examination of her right foot today shows she has a mild cavovarus foot deformity, slight a bit of varus at the heel, and slightly worse than the left side. She has a bit of forefoot-driven varus with a callus underneath of her great toe. She is tender with eversion as well as plantarflexion of the great toe.  She is tender across the anterior aspect joint. Radiograph Examination: AP, mortise, and lateral views of the right ankle were obtained and interpreted in the office today and reveal no sign of full-thickness chondral injury. No signs of osteophytes. There is a 6-degree positive talar first angle with mild varus. The subtalar joint and ankle joint appear to be well preserved. Impression:  Mild cavus foot deformity at postop, stiffness and scarring status post scope, microfracture, Brostrom and brevis repair. Plan:   I reviewed her MRI with her again and there is scar tissue, but no full-thickness chondral injury. The peroneal tendons, I think still look a little suspect, but that is quite difficult to tell on MRI, especially postoperatively. Because she is now complaining of some pain laterally, the plan is to move ahead with an ankle scope and debride the joint as well as peroneal tendon tenolysis. The plan is to let her weight-bear immediately in her tall boot with early aggressive physical therapy. She made a lot of scar tissue with the previous hip surgery. She understands she may still have persistent symptoms and she may needed a cavovarus foot reconstruction later, like a calcaneal osteotomy and 1st metatarsal osteotomy, Anabel, and plantar fascia release; all of which is quite aggressive for what she has ongoing. We will plan to move ahead with outpatient right ankle scope and tenoscopy versus open tenolysis of the peroneal tendons. She is present for this dictation and understands the plan. Minal Coleman MD/ dana

## 2022-01-28 ENCOUNTER — HOSPITAL ENCOUNTER (OUTPATIENT)
Age: 27
Setting detail: OUTPATIENT SURGERY
Discharge: HOME | End: 2022-01-28
Attending: ORTHOPAEDIC SURGERY | Admitting: ORTHOPAEDIC SURGERY
Payer: COMMERCIAL

## 2022-01-28 ENCOUNTER — ANESTHESIA (OUTPATIENT)
Dept: SURGERY | Age: 27
End: 2022-01-28
Payer: COMMERCIAL

## 2022-01-28 VITALS
SYSTOLIC BLOOD PRESSURE: 122 MMHG | RESPIRATION RATE: 16 BRPM | HEIGHT: 67 IN | HEART RATE: 99 BPM | OXYGEN SATURATION: 95 % | DIASTOLIC BLOOD PRESSURE: 83 MMHG | BODY MASS INDEX: 28.36 KG/M2 | TEMPERATURE: 98.1 F | WEIGHT: 180.7 LBS

## 2022-01-28 DIAGNOSIS — M67.871 OTHER SPECIFIED DISORDERS OF SYNOVIUM, RIGHT ANKLE AND FOOT: Primary | ICD-10-CM

## 2022-01-28 LAB — HCG UR QL: NEGATIVE

## 2022-01-28 PROCEDURE — 77030020131 HC STRAP ANK FT DISTR S&N -B: Performed by: ORTHOPAEDIC SURGERY

## 2022-01-28 PROCEDURE — 74011250636 HC RX REV CODE- 250/636: Performed by: ORTHOPAEDIC SURGERY

## 2022-01-28 PROCEDURE — 77030002933 HC SUT MCRYL J&J -A: Performed by: ORTHOPAEDIC SURGERY

## 2022-01-28 PROCEDURE — 74011000250 HC RX REV CODE- 250: Performed by: ORTHOPAEDIC SURGERY

## 2022-01-28 PROCEDURE — 77030031139 HC SUT VCRL2 J&J -A: Performed by: ORTHOPAEDIC SURGERY

## 2022-01-28 PROCEDURE — 77030002916 HC SUT ETHLN J&J -A: Performed by: ORTHOPAEDIC SURGERY

## 2022-01-28 PROCEDURE — 77030018835 HC SOL IRR LR ICUM -A: Performed by: ORTHOPAEDIC SURGERY

## 2022-01-28 PROCEDURE — 77030034478 HC TU IRR ARTHRO PT ARTH -B: Performed by: ORTHOPAEDIC SURGERY

## 2022-01-28 PROCEDURE — 76210000006 HC OR PH I REC 0.5 TO 1 HR: Performed by: ORTHOPAEDIC SURGERY

## 2022-01-28 PROCEDURE — 2709999900 HC NON-CHARGEABLE SUPPLY: Performed by: ORTHOPAEDIC SURGERY

## 2022-01-28 PROCEDURE — 77030000032 HC CUF TRNQT ZIMM -B: Performed by: ORTHOPAEDIC SURGERY

## 2022-01-28 PROCEDURE — 74011000250 HC RX REV CODE- 250: Performed by: ANESTHESIOLOGY

## 2022-01-28 PROCEDURE — 77030012508 HC MSK AIRWY LMA AMBU -A: Performed by: ANESTHESIOLOGY

## 2022-01-28 PROCEDURE — 81025 URINE PREGNANCY TEST: CPT

## 2022-01-28 PROCEDURE — 77030002912 HC SUT ETHBND J&J -A: Performed by: ORTHOPAEDIC SURGERY

## 2022-01-28 PROCEDURE — 77030020782 HC GWN BAIR PAWS FLX 3M -B: Performed by: ORTHOPAEDIC SURGERY

## 2022-01-28 PROCEDURE — 77030022877 HC TU IRR ARTHRO PMP ARTH -B: Performed by: ORTHOPAEDIC SURGERY

## 2022-01-28 PROCEDURE — 76210000021 HC REC RM PH II 0.5 TO 1 HR: Performed by: ORTHOPAEDIC SURGERY

## 2022-01-28 PROCEDURE — 77030006877 HC BLD SHV FLL RAD S&N -B: Performed by: ORTHOPAEDIC SURGERY

## 2022-01-28 PROCEDURE — 77030011930 HC WND ARTHRO ABLT S&N -C: Performed by: ORTHOPAEDIC SURGERY

## 2022-01-28 PROCEDURE — 74011250636 HC RX REV CODE- 250/636: Performed by: ANESTHESIOLOGY

## 2022-01-28 PROCEDURE — 77030006788 HC BLD SAW OSC STRY -B: Performed by: ORTHOPAEDIC SURGERY

## 2022-01-28 PROCEDURE — C9290 INJ, BUPIVACAINE LIPOSOME: HCPCS | Performed by: ORTHOPAEDIC SURGERY

## 2022-01-28 PROCEDURE — 77030040361 HC SLV COMPR DVT MDII -B: Performed by: ORTHOPAEDIC SURGERY

## 2022-01-28 PROCEDURE — 76060000033 HC ANESTHESIA 1 TO 1.5 HR: Performed by: ORTHOPAEDIC SURGERY

## 2022-01-28 PROCEDURE — 76010000149 HC OR TIME 1 TO 1.5 HR: Performed by: ORTHOPAEDIC SURGERY

## 2022-01-28 RX ORDER — GLYCOPYRROLATE 0.2 MG/ML
INJECTION INTRAMUSCULAR; INTRAVENOUS AS NEEDED
Status: DISCONTINUED | OUTPATIENT
Start: 2022-01-28 | End: 2022-01-28 | Stop reason: HOSPADM

## 2022-01-28 RX ORDER — SODIUM CHLORIDE, SODIUM LACTATE, POTASSIUM CHLORIDE, CALCIUM CHLORIDE 600; 310; 30; 20 MG/100ML; MG/100ML; MG/100ML; MG/100ML
1000 INJECTION, SOLUTION INTRAVENOUS CONTINUOUS
Status: DISCONTINUED | OUTPATIENT
Start: 2022-01-28 | End: 2022-01-28 | Stop reason: HOSPADM

## 2022-01-28 RX ORDER — HYDROCODONE BITARTRATE AND ACETAMINOPHEN 5; 325 MG/1; MG/1
1-2 TABLET ORAL
Qty: 30 TABLET | Refills: 0 | Status: SHIPPED | OUTPATIENT
Start: 2022-01-28 | End: 2022-02-02

## 2022-01-28 RX ORDER — MAGNESIUM SULFATE 100 %
4 CRYSTALS MISCELLANEOUS AS NEEDED
Status: DISCONTINUED | OUTPATIENT
Start: 2022-01-28 | End: 2022-01-28 | Stop reason: HOSPADM

## 2022-01-28 RX ORDER — FENTANYL CITRATE 50 UG/ML
25 INJECTION, SOLUTION INTRAMUSCULAR; INTRAVENOUS AS NEEDED
Status: DISCONTINUED | OUTPATIENT
Start: 2022-01-28 | End: 2022-01-28 | Stop reason: HOSPADM

## 2022-01-28 RX ORDER — KETAMINE HYDROCHLORIDE 10 MG/ML
INJECTION, SOLUTION INTRAMUSCULAR; INTRAVENOUS AS NEEDED
Status: DISCONTINUED | OUTPATIENT
Start: 2022-01-28 | End: 2022-01-28 | Stop reason: HOSPADM

## 2022-01-28 RX ORDER — EPHEDRINE SULFATE/0.9% NACL/PF 50 MG/5 ML
SYRINGE (ML) INTRAVENOUS AS NEEDED
Status: DISCONTINUED | OUTPATIENT
Start: 2022-01-28 | End: 2022-01-28 | Stop reason: HOSPADM

## 2022-01-28 RX ORDER — LIDOCAINE HYDROCHLORIDE 20 MG/ML
INJECTION, SOLUTION EPIDURAL; INFILTRATION; INTRACAUDAL; PERINEURAL AS NEEDED
Status: DISCONTINUED | OUTPATIENT
Start: 2022-01-28 | End: 2022-01-28 | Stop reason: HOSPADM

## 2022-01-28 RX ORDER — KETOROLAC TROMETHAMINE 15 MG/ML
INJECTION, SOLUTION INTRAMUSCULAR; INTRAVENOUS AS NEEDED
Status: DISCONTINUED | OUTPATIENT
Start: 2022-01-28 | End: 2022-01-28 | Stop reason: HOSPADM

## 2022-01-28 RX ORDER — SODIUM CHLORIDE 0.9 % (FLUSH) 0.9 %
5-40 SYRINGE (ML) INJECTION EVERY 8 HOURS
Status: DISCONTINUED | OUTPATIENT
Start: 2022-01-28 | End: 2022-01-28 | Stop reason: HOSPADM

## 2022-01-28 RX ORDER — MIDAZOLAM HYDROCHLORIDE 1 MG/ML
INJECTION, SOLUTION INTRAMUSCULAR; INTRAVENOUS AS NEEDED
Status: DISCONTINUED | OUTPATIENT
Start: 2022-01-28 | End: 2022-01-28 | Stop reason: HOSPADM

## 2022-01-28 RX ORDER — ROPIVACAINE HYDROCHLORIDE 5 MG/ML
INJECTION, SOLUTION EPIDURAL; INFILTRATION; PERINEURAL AS NEEDED
Status: DISCONTINUED | OUTPATIENT
Start: 2022-01-28 | End: 2022-01-28 | Stop reason: HOSPADM

## 2022-01-28 RX ORDER — FLUMAZENIL 0.1 MG/ML
0.2 INJECTION INTRAVENOUS
Status: DISCONTINUED | OUTPATIENT
Start: 2022-01-28 | End: 2022-01-28 | Stop reason: HOSPADM

## 2022-01-28 RX ORDER — SODIUM CHLORIDE 0.9 % (FLUSH) 0.9 %
5-40 SYRINGE (ML) INJECTION AS NEEDED
Status: DISCONTINUED | OUTPATIENT
Start: 2022-01-28 | End: 2022-01-28 | Stop reason: HOSPADM

## 2022-01-28 RX ORDER — PROPOFOL 10 MG/ML
INJECTION, EMULSION INTRAVENOUS AS NEEDED
Status: DISCONTINUED | OUTPATIENT
Start: 2022-01-28 | End: 2022-01-28 | Stop reason: HOSPADM

## 2022-01-28 RX ORDER — NALOXONE HYDROCHLORIDE 0.4 MG/ML
0.1 INJECTION, SOLUTION INTRAMUSCULAR; INTRAVENOUS; SUBCUTANEOUS AS NEEDED
Status: DISCONTINUED | OUTPATIENT
Start: 2022-01-28 | End: 2022-01-28 | Stop reason: HOSPADM

## 2022-01-28 RX ORDER — HYDROMORPHONE HYDROCHLORIDE 1 MG/ML
0.5 INJECTION, SOLUTION INTRAMUSCULAR; INTRAVENOUS; SUBCUTANEOUS
Status: DISCONTINUED | OUTPATIENT
Start: 2022-01-28 | End: 2022-01-28 | Stop reason: HOSPADM

## 2022-01-28 RX ORDER — SODIUM CHLORIDE, SODIUM LACTATE, POTASSIUM CHLORIDE, CALCIUM CHLORIDE 600; 310; 30; 20 MG/100ML; MG/100ML; MG/100ML; MG/100ML
125 INJECTION, SOLUTION INTRAVENOUS CONTINUOUS
Status: DISCONTINUED | OUTPATIENT
Start: 2022-01-28 | End: 2022-01-28 | Stop reason: HOSPADM

## 2022-01-28 RX ORDER — CEFAZOLIN SODIUM/WATER 2 G/20 ML
2 SYRINGE (ML) INTRAVENOUS ONCE
Status: COMPLETED | OUTPATIENT
Start: 2022-01-28 | End: 2022-01-28

## 2022-01-28 RX ORDER — ONDANSETRON 2 MG/ML
INJECTION INTRAMUSCULAR; INTRAVENOUS AS NEEDED
Status: DISCONTINUED | OUTPATIENT
Start: 2022-01-28 | End: 2022-01-28 | Stop reason: HOSPADM

## 2022-01-28 RX ORDER — HYDROMORPHONE HYDROCHLORIDE 2 MG/ML
INJECTION, SOLUTION INTRAMUSCULAR; INTRAVENOUS; SUBCUTANEOUS AS NEEDED
Status: DISCONTINUED | OUTPATIENT
Start: 2022-01-28 | End: 2022-01-28 | Stop reason: HOSPADM

## 2022-01-28 RX ORDER — DEXAMETHASONE SODIUM PHOSPHATE 4 MG/ML
INJECTION, SOLUTION INTRA-ARTICULAR; INTRALESIONAL; INTRAMUSCULAR; INTRAVENOUS; SOFT TISSUE AS NEEDED
Status: DISCONTINUED | OUTPATIENT
Start: 2022-01-28 | End: 2022-01-28 | Stop reason: HOSPADM

## 2022-01-28 RX ADMIN — KETAMINE HYDROCHLORIDE 10 MG: 10 INJECTION, SOLUTION INTRAMUSCULAR; INTRAVENOUS at 08:37

## 2022-01-28 RX ADMIN — Medication 5 MG: at 08:27

## 2022-01-28 RX ADMIN — KETOROLAC TROMETHAMINE 30 MG: 15 INJECTION, SOLUTION INTRAMUSCULAR; INTRAVENOUS at 09:04

## 2022-01-28 RX ADMIN — ONDANSETRON HYDROCHLORIDE 4 MG: 2 INJECTION INTRAMUSCULAR; INTRAVENOUS at 08:12

## 2022-01-28 RX ADMIN — KETAMINE HYDROCHLORIDE 10 MG: 10 INJECTION, SOLUTION INTRAMUSCULAR; INTRAVENOUS at 08:28

## 2022-01-28 RX ADMIN — MIDAZOLAM 2 MG: 1 INJECTION INTRAMUSCULAR; INTRAVENOUS at 07:44

## 2022-01-28 RX ADMIN — KETAMINE HYDROCHLORIDE 10 MG: 10 INJECTION, SOLUTION INTRAMUSCULAR; INTRAVENOUS at 07:57

## 2022-01-28 RX ADMIN — SODIUM CHLORIDE, SODIUM LACTATE, POTASSIUM CHLORIDE, AND CALCIUM CHLORIDE 125 ML/HR: 600; 310; 30; 20 INJECTION, SOLUTION INTRAVENOUS at 06:55

## 2022-01-28 RX ADMIN — SODIUM CHLORIDE, SODIUM LACTATE, POTASSIUM CHLORIDE, AND CALCIUM CHLORIDE: 600; 310; 30; 20 INJECTION, SOLUTION INTRAVENOUS at 09:06

## 2022-01-28 RX ADMIN — KETAMINE HYDROCHLORIDE 20 MG: 10 INJECTION, SOLUTION INTRAMUSCULAR; INTRAVENOUS at 07:51

## 2022-01-28 RX ADMIN — Medication 2 G: at 07:56

## 2022-01-28 RX ADMIN — HYDROMORPHONE HYDROCHLORIDE 0.5 MG: 2 INJECTION, SOLUTION INTRAMUSCULAR; INTRAVENOUS; SUBCUTANEOUS at 08:45

## 2022-01-28 RX ADMIN — LIDOCAINE HYDROCHLORIDE 80 MG: 20 INJECTION, SOLUTION INTRAVENOUS at 07:53

## 2022-01-28 RX ADMIN — HYDROMORPHONE HYDROCHLORIDE 0.5 MG: 2 INJECTION, SOLUTION INTRAMUSCULAR; INTRAVENOUS; SUBCUTANEOUS at 07:53

## 2022-01-28 RX ADMIN — DEXAMETHASONE SODIUM PHOSPHATE 4 MG: 4 INJECTION, SOLUTION INTRAMUSCULAR; INTRAVENOUS at 08:12

## 2022-01-28 RX ADMIN — PROPOFOL 200 MG: 10 INJECTION, EMULSION INTRAVENOUS at 07:53

## 2022-01-28 RX ADMIN — GLYCOPYRROLATE 0.2 MG: 0.2 INJECTION INTRAMUSCULAR; INTRAVENOUS at 07:44

## 2022-01-28 NOTE — INTERVAL H&P NOTE
Update History & Physical    The Patient's History and Physical was reviewed with the patient and I examined the patient. There was no change. The surgical site was confirmed by the patient and me. Plan:  The risk, benefits, expected outcome, and alternative to the recommended procedure have been discussed with the patient. Patient understands and wants to proceed with the procedure.     Electronically signed by Laina Ricks MD on 1/28/2022 at 7:40 AM

## 2022-01-28 NOTE — PERIOP NOTES
Reviewed PTA medication list with patient/caregiver and patient/caregiver denies any additional medications. Patient admits to having a responsible adult care for them at home for at least 24 hours after surgery. Patient encouraged to use gown warming system and informed that using said warming gown to regulate body temperature prior to a procedure has been shown to help reduce the risks of blood clots and infection. Patient's pharmacy of choice verified and documented in PTA medication section. Dual skin assessment & fall risk band verification completed with Milagros Padilla.

## 2022-01-28 NOTE — PERIOP NOTES
Discharge instructions reviewed with patient and family. Opportunity for questions and answers given. No further questions at this time. Tolerating po fluids and crackers - denies c/o pain - denies nausea - will plan for discharge.   Pt rita in her possesion

## 2022-01-28 NOTE — ANESTHESIA POSTPROCEDURE EVALUATION
Procedure(s):  RIGHT ANKLE ARTHROSCOPY  WITH OPEN PERONEAL TENOLYSIS. general    Anesthesia Post Evaluation        Comments: Post-Anesthesia Evaluation and Assessment    Cardiovascular Function/Vital Signs  /74   Pulse (!) 104   Temp 36.8 °C (98.2 °F)   Resp 14   Ht 5' 7\" (1.702 m)   Wt 82 kg (180 lb 11.2 oz)   SpO2 97%   BMI 28.30 kg/m²     Patient is status post Procedure(s):  RIGHT ANKLE ARTHROSCOPY  WITH OPEN PERONEAL TENOLYSIS. Nausea/Vomiting: Controlled. Postoperative hydration reviewed and adequate. Pain:  Pain Scale 1: FLACC (01/28/22 0920)  Pain Intensity 1: 0 (01/28/22 0920)   Managed. Neurological Status:   Neuro (WDL): Within Defined Limits (01/28/22 0920)   At baseline. Mental Status and Level of Consciousness: Arousable. Pulmonary Status:   O2 Device: None (Room air) (01/28/22 0930)   Adequate oxygenation and airway patent. Complications related to anesthesia: None    Post-anesthesia assessment completed. No concerns. Patient has met all discharge requirements. Signed By: Glenn Sutton MD    January 28, 2022                   INITIAL Post-op Vital signs:   Vitals Value Taken Time   /74 01/28/22 0935   Temp 36.8 °C (98.2 °F) 01/28/22 0920   Pulse 95 01/28/22 0936   Resp 11 01/28/22 0936   SpO2 96 % 01/28/22 0936   Vitals shown include unvalidated device data.

## 2022-01-28 NOTE — DISCHARGE INSTRUCTIONS
Follow all discharge instructions from Dr. Penelope Major  Resume pre hospital diet  Drink plenty of fluids  Non Weight Bearing of operative leg  Follow up with Dr. Penelope Major as scheduled - or call for appointment      DISCHARGE SUMMARY from Nurse    PATIENT INSTRUCTIONS:    After general anesthesia or intravenous sedation, for 24 hours or while taking prescription Narcotics:  · Limit your activities  · Do not drive and operate hazardous machinery  · Do not make important personal or business decisions  · Do  not drink alcoholic beverages  · If you have not urinated within 8 hours after discharge, please contact your surgeon on call. Report the following to your surgeon:  · Excessive pain, swelling, redness or odor of or around the surgical area  · Temperature over 100.5  · Nausea and vomiting lasting longer than 4 hours or if unable to take medications  · Any signs of decreased circulation or nerve impairment to extremity: change in color, persistent  numbness, tingling, coldness or increase pain  · Any questions    What to do at Home:  Recommended activity: Ambulate in house ( non weight bearing ) and See surgical instructions,     If you experience any of the following symptoms fever, chills, uncontrollable pain , active bleeding or drainage, circulation changes ( cold, numb, blue extremities ), please follow up with Dr. Penelope Major. *  Please give a list of your current medications to your Primary Care Provider. *  Please update this list whenever your medications are discontinued, doses are      changed, or new medications (including over-the-counter products) are added. *  Please carry medication information at all times in case of emergency situations. These are general instructions for a healthy lifestyle:    No smoking/ No tobacco products/ Avoid exposure to second hand smoke  Surgeon General's Warning:  Quitting smoking now greatly reduces serious risk to your health.     Obesity, smoking, and sedentary lifestyle greatly increases your risk for illness    A healthy diet, regular physical exercise & weight monitoring are important for maintaining a healthy lifestyle    You may be retaining fluid if you have a history of heart failure or if you experience any of the following symptoms:  Weight gain of 3 pounds or more overnight or 5 pounds in a week, increased swelling in our hands or feet or shortness of breath while lying flat in bed. Please call your doctor as soon as you notice any of these symptoms; do not wait until your next office visit. Patient armband removed and shredded    The discharge information has been reviewed with the patient and caregiver. The patient and caregiver verbalized understanding. Discharge medications reviewed with the patient and caregiver and appropriate educational materials and side effects teaching were provided. Learning About COVID-19 and Social Distancing  What is it? Social distancing means putting space between yourself and other people. The recommended distance is 6 feet, or about 2 meters. This also means staying away from any place where people may gather, such as max or other public gathering places. Why is it important? Social distancing is the best way to reduce the spread of COVID-19. This virus seems to spread from person to person through droplets from coughing and sneezing. So if you keep your distance from others, you're less likely to get it or spread it. And social distancing is important for everyone, not just those who are at high risk of infection, like older people. You might have the virus but not have symptoms. You could then give the infection to someone you come into contact with. How is it done? Putting 6 feet, or about 2 meters, between you and other people is the recommended distance. Also stay away from any place where people may gather, such as max or other public gathering places.  So if possible:  · Work from home, and keep your kids at home. · Don't travel if you don't have to. And avoid public transportation, ride-shares, and taxis unless you have no choice. · Limit shopping to essentials, like food and medicines. · Wear a cloth face cover if you have to go to a public place like the grocery store or pharmacy. · Don't eat in restaurants. (You can still get takeout or food deliveries.)  · Avoid crowds and busy places. Follow stay-at-home orders or other directions for your area. Where can you learn more? Go to http://www.gray.com/  Enter A133 in the search box to learn more about \"Learning About COVID-19 and Social Distancing. \"  Current as of: December 18, 2020               Content Version: 12.8  © 3110-6378 [employer]. Care instructions adapted under license by [employer] (which disclaims liability or warranty for this information). If you have questions about a medical condition or this instruction, always ask your healthcare professional. [address] any warranty or liability for your use of this information.     ___________________________________________________________________________________________________________________________________

## 2022-01-28 NOTE — OP NOTES
[facility]  OPER[facility]    Name:  Arnav Arenas  MR#:   [id number]  :  1995  ACCOUNT #:  [de-identified]  DATE OF SERVICE:  2022    PREOPERATIVE DIAGNOSES:  Right ankle synovitis, peroneal tendon tear versus tenosynovitis. POSTOPERATIVE DIAGNOSES:  Scarring on the peroneal tendon heel peroneus brevis, significant scarring in the tibiotalar joint, intact cartilage. PROCEDURE PERFORMED: right ankle scope with extensive debridement, tenolysis of the peroneal tendons    SURGEON:  Giacomo Johnson MD    ASSISTANT:  Christos Brannon. ANESTHESIA:  General.    COMPLICATIONS:  None. SPECIMENS REMOVED:  None. IMPLANTS:  None. ESTIMATED BLOOD LOSS:  Less than 50. 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 Overall, she did well, but she was complaining of some pain with tibiotalar joint as well as some pain on the outside along her peroneal tendons. On examination, she seemed to have a stable talar tilt with the pain along the joint. Injection gave her temporary relief. MRI with arthrogram showed no signs of full-thickness chondral injury, but significant scarring in the joint. Therefore, having failed physical therapy and injection, she wished to proceed with a clean out as well as some pain in the outside and would look at the tendon repair as well. PROCEDURE:  She was marked preoperatively. After marking, she underwent general anesthesia with an LMA. She was prepped and draped, bump placed under the hip, leg was placed in the Ferkel leg marinelli and bone foam.  After prepping and draping up to the thigh tourniquet, reviewed her x-rays, MRI, time and indications, and after doing this, the two portals were injected with 0.5% Naropin. After cutting skin only bluntly down to the capsule, we could not see the cartilage as there was so much scarring across the front of the joint.   We introduced the working portal and was able to find the sucker shaver and debrided the scar tissue up in front of the joint. We then used a 2.9 full-radius to debride the scar tissue across the front until we could get a good window and then used the Mikey System and Yoshi to debride the scar tissue. We cleaned up the medial gutter, anterior gutter, lateral gutter. Of note, she did seem to have some hypersensitivity along the nerve. When using the Werewolf for foot, would twitch during the case, therefore, we stuck with the low power cautery only to make sure we did not have lot of bleeding in the joint. We removed the scar tissue down the lateral gutter, anterior gutter until we had no signs of impingement across front of the joint. We distracted the ankle and looked, did not see any signs of full-thickness chondral injury and the tibia and talar cartilage appeared to be intact. After doing this, we removed the camera and closed the portals with nylon and took the leg out of the [employer] leg marinelli. We then opened up the previous Brostrom and peroneus brevis repair incision and again found a fair amount of scar tissue. There was fluid along the peroneal tendon sheath, which was debrided. We freed this up and then took the scar tissue off the peroneus longus. The peroneus brevis, we pulled it out of the sheath and this Ethibond suture appeared to be intact. It was not gliding properly, so we used a [place] elevator, freed up proximally and distally and then debrided the scar tissue. We washed this out with normal saline and Betadine. We closed the retinaculum with Ethibond to minimize scarring. The skin was closed with Vicryl and Monocryl. Steri-Strips were applied. We injected the joint with Marcaine, Naropin, and morphine injected along the incision with Marcaine and Naropin. She was placed in a soft dressing, into a tall CAM boot. She will be weightbearing as tolerated.   She has early range of motion. Prescription was sent to her pharmacy. Her roommate [facility] was contacted and given postoperative instructions.       Sue Agarwal MD      [id number]  D:  01/28/2022 9:53  T:  01/28/2022 17:05  JOB #:  [id number]

## 2022-01-28 NOTE — BRIEF OP NOTE
Brief Postoperative Note    Patient: Yanci Ge  YOB: 1995  MRN: [id number]    Date of Procedure: 1/28/2022     Pre-Op Diagnosis: RIGHT ANKLE SYNOVITIS, SCARRING PERONEAL TENDONITIS    Post-Op Diagnosis: Same as preoperative diagnosis.       Procedure(s):  RIGHT ANKLE ARTHROSCOPY  WITH OPEN PERONEAL TENOLYSIS    Surgeon(s):  Kath Thacker MD    Surgical Assistant: Surg Asst-1: [place]    Anesthesia: General     Estimated Blood Loss (mL): less than 50     Complications: None    Specimens: * No specimens in log *     Implants: * No implants in log *    Drains: * No LDAs found *    Findings: significant scarring, healed PBT    Electronically Signed by Ghazal Yanes MD on 1/28/2022 at 9:21 AM  Emergency Contact(s)    Name Relation Home Work [address] [place] [address] Friend   [id number] Mother [phone]   [address] Father [phone]  [phone]

## (undated) DEVICE — STRIP WND CLSR FLX SELF ADH NO [TP1103] [INTEGRA LIFESCIENCES CORP]

## (undated) DEVICE — DYONICS 2.9 MM FULL RADIUS BLADES,                                    7 CM LENGTH, RED, PACKAGED 6 PER                                    BOX, STERILE: Brand: DYONICS POWERMINI

## (undated) DEVICE — 3-0 COATED VICRYL PLUS UNDYED 1X27" SH --

## (undated) DEVICE — TUBING PMP L8FT LNG W/ CONN FOR AR-6400 REDEUCE

## (undated) DEVICE — TUBE IRRIG L8IN LNG PT W/ CONN FOR PMP SYS REDEUCE

## (undated) DEVICE — SUTURE ETHBND SZ 3-0 L30IN NONABSORBABLE GRN SH L26MM 1/2 X562H

## (undated) DEVICE — SOLUTION IRRIG 3000ML LAC R FLX CONT

## (undated) DEVICE — PAD,ABDOMINAL,5"X9",ST,LF,25/BX: Brand: MEDLINE INDUSTRIES, INC.

## (undated) DEVICE — BANDAGE COMPR W4INXL10YD WHITE/BEIGE E MTRX HK LOOP CLSR

## (undated) DEVICE — PREP SKN CHLRAPRP APL 26ML STR --

## (undated) DEVICE — SUTURE ETHLN SZ 4-0 L18IN NONABSORBABLE BLK L19MM PS-2 3/8 1667H

## (undated) DEVICE — PRECISION (9.0 X 0.51 X 25.0MM)

## (undated) DEVICE — STERILE POLYISOPRENE POWDER-FREE SURGICAL GLOVES: Brand: PROTEXIS

## (undated) DEVICE — DRAPE EQUIP CARM MINI STRL

## (undated) DEVICE — SUT MONOCRYL PLUS UD 4-0 --

## (undated) DEVICE — GARMENT,MEDLINE,DVT,INT,CALF,MED, GEN2: Brand: MEDLINE

## (undated) DEVICE — SPONGE LAP 18X18IN STRL -- 5/PK

## (undated) DEVICE — PADDING CAST W4INXL4YD ST COT COHESIVE HND TEARABLE SPEC

## (undated) DEVICE — ZIMMER® STERILE DISPOSABLE TOURNIQUET CUFF WITH PLC, DUAL PORT, SINGLE BLADDER, 34 IN. (86 CM)

## (undated) DEVICE — GUHL ANKLE DISTRACTOR FOOT STRAPS,                                    STERILE, LATEX FREE BOX OF 6

## (undated) DEVICE — NEEDLE HYPO 19GA L1.5IN BRN POLYPR HUB S STL THN WALL FILL

## (undated) DEVICE — 3.0 MM COVAC 50 INTEGRATED CABLE                                    WAND ICW: Brand: COBLATION

## (undated) DEVICE — PACK PROCEDURE SURG EXTREMITY CUST

## (undated) DEVICE — SUTURE VCRL SZ 4-0 L18IN ABSRB UD L19MM PS-2 3/8 CIR PRIM J496H

## (undated) DEVICE — DRESSING,GAUZE,XEROFORM,CURAD,1"X8",ST: Brand: CURAD

## (undated) DEVICE — NDL SUT TAPR PT TNSL DVIS 0.5 --

## (undated) DEVICE — GLOVE ORANGE PI 8 1/2   MSG9085

## (undated) DEVICE — TOWEL,OR,DSP,ST,BLUE,STD,4/PK,20PK/CS: Brand: MEDLINE